# Patient Record
Sex: MALE
[De-identification: names, ages, dates, MRNs, and addresses within clinical notes are randomized per-mention and may not be internally consistent; named-entity substitution may affect disease eponyms.]

---

## 2022-07-19 ENCOUNTER — NON-APPOINTMENT (OUTPATIENT)
Age: 47
End: 2022-07-19

## 2023-03-08 ENCOUNTER — APPOINTMENT (OUTPATIENT)
Dept: NEUROLOGY | Facility: CLINIC | Age: 48
End: 2023-03-08
Payer: COMMERCIAL

## 2023-03-08 DIAGNOSIS — S84.10XA INJURY OF PERONEAL NERVE AT LOWER LEG LEVEL, UNSPECIFIED LEG, INITIAL ENCOUNTER: ICD-10-CM

## 2023-03-08 DIAGNOSIS — M54.50 LOW BACK PAIN, UNSPECIFIED: ICD-10-CM

## 2023-03-08 DIAGNOSIS — R29.898 OTHER SYMPTOMS AND SIGNS INVOLVING THE MUSCULOSKELETAL SYSTEM: ICD-10-CM

## 2023-03-08 PROBLEM — Z00.00 ENCOUNTER FOR PREVENTIVE HEALTH EXAMINATION: Status: ACTIVE | Noted: 2023-03-08

## 2023-03-08 PROCEDURE — 99205 OFFICE O/P NEW HI 60 MIN: CPT

## 2023-03-08 RX ORDER — MELOXICAM 15 MG/1
15 TABLET ORAL
Qty: 14 | Refills: 0 | Status: ACTIVE | COMMUNITY
Start: 2023-03-08 | End: 1900-01-01

## 2023-03-08 RX ORDER — TIZANIDINE 2 MG/1
2 TABLET ORAL
Qty: 30 | Refills: 0 | Status: ACTIVE | COMMUNITY
Start: 2023-03-08 | End: 1900-01-01

## 2023-03-15 ENCOUNTER — LABORATORY RESULT (OUTPATIENT)
Age: 48
End: 2023-03-15

## 2023-03-16 LAB
ALBUMIN SERPL ELPH-MCNC: 4.6 G/DL
ALP BLD-CCNC: 56 U/L
ALT SERPL-CCNC: 44 U/L
ANION GAP SERPL CALC-SCNC: 12 MMOL/L
AST SERPL-CCNC: 29 U/L
BASOPHILS # BLD AUTO: 0.02 K/UL
BASOPHILS NFR BLD AUTO: 0.4 %
BILIRUB SERPL-MCNC: 0.6 MG/DL
BUN SERPL-MCNC: 22 MG/DL
CALCIUM SERPL-MCNC: 10 MG/DL
CHLORIDE SERPL-SCNC: 98 MMOL/L
CHOLEST SERPL-MCNC: 230 MG/DL
CO2 SERPL-SCNC: 28 MMOL/L
CREAT SERPL-MCNC: 1.14 MG/DL
CRP SERPL-MCNC: <3 MG/L
EGFR: 80 ML/MIN/1.73M2
EOSINOPHIL # BLD AUTO: 0.2 K/UL
EOSINOPHIL NFR BLD AUTO: 3.6 %
ERYTHROCYTE [SEDIMENTATION RATE] IN BLOOD BY WESTERGREN METHOD: 25 MM/HR
ESTIMATED AVERAGE GLUCOSE: 117 MG/DL
FOLATE SERPL-MCNC: >20 NG/ML
GLUCOSE SERPL-MCNC: 95 MG/DL
HAV IGM SER QL: NONREACTIVE
HBA1C MFR BLD HPLC: 5.7 %
HBV CORE IGM SER QL: NONREACTIVE
HBV SURFACE AG SER QL: NONREACTIVE
HCT VFR BLD CALC: 45.9 %
HCV AB SER QL: NONREACTIVE
HCV S/CO RATIO: 0.09 S/CO
HCYS SERPL-MCNC: 10.8 UMOL/L
HDLC SERPL-MCNC: 42 MG/DL
HGB BLD-MCNC: 16 G/DL
IMM GRANULOCYTES NFR BLD AUTO: 0.4 %
LDLC SERPL CALC-MCNC: 152 MG/DL
LYMPHOCYTES # BLD AUTO: 1.69 K/UL
LYMPHOCYTES NFR BLD AUTO: 30.3 %
MAN DIFF?: NORMAL
MCHC RBC-ENTMCNC: 30.8 PG
MCHC RBC-ENTMCNC: 34.9 GM/DL
MCV RBC AUTO: 88.3 FL
MONOCYTES # BLD AUTO: 0.55 K/UL
MONOCYTES NFR BLD AUTO: 9.9 %
NEUTROPHILS # BLD AUTO: 3.09 K/UL
NEUTROPHILS NFR BLD AUTO: 55.4 %
NONHDLC SERPL-MCNC: 188 MG/DL
PLATELET # BLD AUTO: 359 K/UL
POTASSIUM SERPL-SCNC: 4.6 MMOL/L
PROT SERPL-MCNC: 7.5 G/DL
RBC # BLD: 5.2 M/UL
RBC # FLD: 12.2 %
RHEUMATOID FACT SER QL: <10 IU/ML
SODIUM SERPL-SCNC: 138 MMOL/L
TRIGL SERPL-MCNC: 182 MG/DL
TSH SERPL-ACNC: 3.04 UIU/ML
VIT B12 SERPL-MCNC: 1049 PG/ML
WBC # FLD AUTO: 5.57 K/UL

## 2023-03-16 NOTE — HISTORY OF PRESENT ILLNESS
[FreeTextEntry1] : 48 YO RH man presents today for right foot weakness. He went to [facility] 12 days ago. He was in a small cramped car for about four hours. When he stood up he was not able to dorsiflex his foot and feels that there is no range of motion. He endorses numbness on the dorsal aspect of the foot including all the toes. He has lower back pain and sciatica with shooting pains down his leg at times.  He denies any falls. This is the first time something like this ever happened.  He denies associated neurological symptoms including headache,  dizziness, lightheadedness, vision changes, nausea, vomiting, speech difficulty/swallowing, weakness, hearing loss/tinnitus or paresthesias. No chest pain, palpitations or shortness of breath.\par  \par He has a history HTN & HLD.No surgical history.   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 He is single. On average sleeping 5-6 hours. Witnessed snoring.On occasion wakes up in the middle of the night gasping for air. Former smoker 4 years ago. His mother has a history of brain aneurysm. His father has a history of Parkinson's.

## 2023-03-16 NOTE — PHYSICAL EXAM
[FreeTextEntry1] : Constitutional:  Patient was well-developed, well-nourished and in no acute distress. \par \par Head:  Normocephalic, atraumatic. Tympanic membranes were clear. \par \par Neck:  Supple with full range of motion. \par \par Cardiovascular:  Cardiac rhythm was regular without murmur. There were no carotid bruits. Peripheral pulses were full and symmetric. \par \par Respiratory:  Lungs were clear. \par \par Abdomen:  Soft and nontender. \par \par Spine:  Nontender. \par \par Skin:  There were no rashes. \par \par NEUROLOGICAL EXAMINATION:\par \par Mental Status: Patient was alert and oriented. Speech was fluent. There was no dysarthria. \par \par Cranial Nerves: \par \par II: Visual acuity was 20/ 25 on the right and 20/25 -2 on the left bilaterally with near card. Pupils were equal and reactive. Visual fields were full. \par \par III, IV, VI:  Eye movements were full without nystagmus. \par \par V: Facial sensation was intact. \par \par VII: Facial strength was normal. \par \par VIII: Hearing was equal. \par \par IX, X: Palatal movement was normal. Phonation was normal. \par \par XI: Sternocleidomastoids and trapezii were normal. \par \par XII: Tongue was midline and movements normal. There was no lingual atrophy or fasciculations. \par \par Motor Examination: Muscle tone was decreased on the right calf.  Right foot dorsiflexion was severely weak. Plantarflexion on the right foot was moderately weak. Inversion/eversion was moderately weak on the right foot. He was able to abduct the toes of both feel right < left.  Hip flexion/extension, knee extension/extension, hip abduction/adduction was normal bilaterally. \par \par Straight leg test: Reproducible pain with the right leg \par \par Sensory Examination: Pinprick was diminished on the right medial and posterior calf.  Vibration was intact. Joint position sense was decreased on the right foot \par \par Reflexes: DTRs were 2+ throughout. \par \par Plantar Responses: Plantar responses were flexor. No clonus\par \par Coordination/Cerebellar Function: There was no dysmetria on finger to nose. \par \par Gait/Stance: Difficulty with tandem gait. He is not able to balance on the right foot. Steppage gait. Romberg was negative. \par

## 2023-03-16 NOTE — ASSESSMENT
[FreeTextEntry1] : 48 YO RH man w/ PMH of HLD presents with an acute onset of right foot weakness after traveling to [facility] and sitting in a car for four hours occurring about twelve days ago. He has difficulty dorsiflexing his foot and complains of sensory changes to the distal foot. On exam he had a steppage gait, with severe right foot motor weakness of dorsiflexion and moderate weakness with plantarflexion and  inversion/eversion. Sensory changes &  impaired proprioception noted on the right distal limb as well. Clinical course will be dependent upon response to diagnostics and therapy.\par \par Impression: Most likely peroneal injury  r/o other axonal injuries vs neuropathies \par \par Plan:\par [] NCS/EMG\par [] PT\par [] PMR for splint\par [] metabolic & neuropathy w/o\par [] Two week trial of tizanidine & NSAID PRN for lower back pain \par \par Communication via telephone and portal in place. The patient will call the office if there is any new neurological complaints and will seek emergent evaluation for concerning red flag symptoms discussed.\par

## 2023-03-18 LAB
COPPER SERPL-MCNC: 100 UG/DL
T PALLIDUM AB SER QL IA: NEGATIVE
ZINC SERPL-MCNC: 93 UG/DL

## 2023-03-23 LAB
ANA SER IF-ACNC: NEGATIVE
ANACR T: NEGATIVE
ARSENIC BLD-MCNC: 4 UG/L
CADMIUM SERPL-MCNC: <0.5 UG/L
CRYOGLOB SERPL-MCNC: NEGATIVE
ENA SS-A AB SER IA-ACNC: <0.2 AL
ENA SS-B AB SER IA-ACNC: 1.7 AL
IGA 24H UR QL IFE: NORMAL
LEAD BLD-MCNC: <1 UG/DL
M PROTEIN SPEC IFE-MCNC: NORMAL
MAG AB SER QL: NEGATIVE
MERCURY BLD-MCNC: 5.2 UG/L
PANTOTHENATE SERPLBLD-MCNC: 190.7 NG/ML
VIT B1 SERPL-MCNC: 198.7 NMOL/L
VIT B2 SERPL-MCNC: 389 UG/L
VIT B6 SERPL-MCNC: 42.2 UG/L

## 2023-03-24 LAB
SULFATIDE AB SER QL: NORMAL
SULFATIDE ANTIBODIES COMMENTS: NORMAL
SULFATIDE ANTIBODIES METHODS: NORMAL
SULFATIDE ANTIBODIES REFERENCES: NORMAL
SULFATIDE ANTIBODIES TECHNICAL RESULTS: NORMAL

## 2023-05-12 ENCOUNTER — APPOINTMENT (OUTPATIENT)
Dept: NEUROLOGY | Facility: CLINIC | Age: 48
End: 2023-05-12

## 2023-08-16 ENCOUNTER — NON-APPOINTMENT (OUTPATIENT)
Age: 48
End: 2023-08-16

## 2024-01-26 PROCEDURE — 36415 COLL VENOUS BLD VENIPUNCTURE: CPT

## 2024-02-05 ENCOUNTER — NON-APPOINTMENT (OUTPATIENT)
Age: 49
End: 2024-02-05

## 2025-03-22 ENCOUNTER — NON-APPOINTMENT (OUTPATIENT)
Age: 50
End: 2025-03-22

## 2025-07-04 ENCOUNTER — NON-APPOINTMENT (OUTPATIENT)
Age: 50
End: 2025-07-04